# Patient Record
Sex: MALE | URBAN - METROPOLITAN AREA
[De-identification: names, ages, dates, MRNs, and addresses within clinical notes are randomized per-mention and may not be internally consistent; named-entity substitution may affect disease eponyms.]

---

## 2017-09-19 ENCOUNTER — IMPORTED ENCOUNTER (OUTPATIENT)
Dept: URBAN - METROPOLITAN AREA CLINIC 31 | Facility: CLINIC | Age: 77
End: 2017-09-19

## 2017-09-19 PROBLEM — E11.3492: Noted: 2017-09-19

## 2017-09-19 PROBLEM — Z96.1: Noted: 2017-09-19

## 2017-09-19 PROBLEM — H35.3111: Noted: 2017-09-19

## 2017-09-19 PROBLEM — E11.9: Noted: 2017-09-19

## 2017-09-19 PROBLEM — H27.02: Noted: 2017-09-19

## 2017-09-19 PROCEDURE — 92015 DETERMINE REFRACTIVE STATE: CPT

## 2017-09-19 PROCEDURE — 92004 COMPRE OPH EXAM NEW PT 1/>: CPT

## 2017-09-19 NOTE — PATIENT DISCUSSION
1.  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 Discussed the pathophysiology of diabetes and its effect on the eye. Stressed the importance of regular followup and good control of BS BP and Lipids to avoid future complications. 2. OD---DM II without sign of Diabetic Retinopathy OD:  Old PRP scars periphery  POsterior pole is clear and no hemes. 3.  Pseudophakia OD - IOL stable. Open capsule. Monitor. 4. Aphakia OS - 5. ARMD OD dry - Importance of smoking cessation blood pressure control and healthy diet were emphasized. In accordance with the AREDS study a good multivitamin containing EC and Zinc were recommened to be taken daily. Patient was instructed to self monitor their monocular vision (reading/Amsler Grid) at least weekly. Patient should immediately report any new onset of decreased vision or metamorphopsia. 6. Band keratopathy OS-  Use tears and monitor for pain. 7.  Optional rx changes. 8.  Disc with pt and daughter to keep right eye protected. 9.   RTN    1yr CE

## 2018-06-28 NOTE — PATIENT DISCUSSION
punctal probing and irrigation performed in the office with out improvement. Hz of plugs. Alrex rx given OD tid x 10 days.

## 2018-06-28 NOTE — PROCEDURE NOTE: CLINICAL
PROCEDURE NOTE: Dilation of Lacrimal Punctum, With or Without Irrigation Anesthesia: Local. Prep: Alcohol Prep/Wipe. Prior to treatment, the risks/benefits/alternatives were discussed. The patient wished to proceed with procedure. The punctum was dilated with a punctum dilator. A syringe and cannula were used to irrigate water into the canaliculus. Patient tolerated procedure well. There were no complications. Post op instructions given. Parrish Duncan

## 2018-06-28 NOTE — PATIENT DISCUSSION
The IOP remains elevated despite current medication. Glaucoma laser surgery is recommended. The risks, benefits, and alternatives to SLT were discussed.

## 2018-07-26 NOTE — PATIENT DISCUSSION
Discussed with patient treatment options for glaucoma: Drops, SLT laser, or surgery the risks and benefits of each were explained.

## 2018-07-26 NOTE — PROCEDURE NOTE: CLINICAL
PROCEDURE NOTE: SLT OD. Diagnosis: POAG, Mild. Prior to treatment, risks/benefits/alternatives discussed including infection, loss of vision, hemorrhage, cataract, glaucoma, retinal tears or detachment. Lens:  SLT laser lens with goniosol. Power: 1.2mJ. Total applications: 832. Application 888 degrees. Patient tolerated procedure well. There were no complications. Post-op instructions given. Post-op IOP = 22 mmHg. Abimael Devi

## 2018-08-21 NOTE — PROCEDURE NOTE: CLINICAL
PROCEDURE NOTE: SLT OS. Diagnosis: POAG, Mild. Anesthesia: Topical. Prep: Alphagan 0.15%. Prior to treatment, risks/benefits/alternatives discussed including infection, loss of vision, hemorrhage, cataract, glaucoma, retinal tears or detachment. Lens:  SLT laser lens with goniosol. Power: 1.2mJ. Total applications: 011. Application 787 degrees. Patient tolerated procedure well. There were no complications. Post-op instructions given. Post-op IOP = 16 mmHg. Tono Greening

## 2018-11-01 ENCOUNTER — IMPORTED ENCOUNTER (OUTPATIENT)
Dept: URBAN - METROPOLITAN AREA CLINIC 31 | Facility: CLINIC | Age: 78
End: 2018-11-01

## 2018-11-01 PROBLEM — Z96.1: Noted: 2018-11-01

## 2018-11-01 PROBLEM — H27.02: Noted: 2018-11-01

## 2018-11-01 PROBLEM — H35.3111: Noted: 2018-11-01

## 2018-11-01 PROBLEM — E11.9: Noted: 2018-11-01

## 2018-11-01 PROBLEM — E11.3492: Noted: 2018-11-01

## 2018-11-01 PROCEDURE — 92250 FUNDUS PHOTOGRAPHY W/I&R: CPT

## 2018-11-01 PROCEDURE — 92014 COMPRE OPH EXAM EST PT 1/>: CPT

## 2018-11-01 PROCEDURE — 92015 DETERMINE REFRACTIVE STATE: CPT

## 2018-11-01 NOTE — PATIENT DISCUSSION
1.  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 Discussed the pathophysiology of diabetes and its effect on the eye. Stressed the importance of regular followup and good control of BS BP and Lipids to avoid future complications. 2. OD---DM II---NO BDR OD:  Old PRP scars periphery  POsterior pole is clear and no hemes. 3.  Pseudophakia OD - IOL stable. Open capsule. Monitor. 4. Aphakia OS - 5. ARMD OD dry -   No smoking wears swetha suns vits and diet. Amsler Grid) at least weekly. Patient should immediately report any new onset of decreased vision or metamorphopsia. 6. Band keratopathy OS-  Use tears and monitor for pain. 7.  Dist rx good. Near rx has pt holding material at 8-10 inches and pt can see 20/30. Further away it was more blurry. Pt will hold things closer for now instead of changing gls since he seldom reads. Mainly menus. 8.  Disc with pt and daughter to keep right eye protected. 9.   RTN    1yr CE  Early Dementia

## 2021-05-05 NOTE — PATIENT DISCUSSION
Called Amy to check in. Per Yolanda Araya, Cards 1 is capped and cannot direct admit. Can try to admit to hospital medicine. Would need a stat COVID test which is done at our clinic. Left Amy voicemail to review and sent [employer] message. Vidhi Montgomery RN     Recommended artificial tears to use: 1 drop 4x a day in both eyes.

## 2021-12-04 NOTE — PATIENT DISCUSSION
punctal probing and irrigation performed in the office with out improvement. Hz of plugs. Alrex rx given OD tid x 10 days. No

## 2022-04-02 ASSESSMENT — VISUAL ACUITY: OD_SC: 20/25-3

## 2022-04-02 ASSESSMENT — TONOMETRY
OS_IOP_MMHG: 10
OD_IOP_MMHG: 16
OS_IOP_MMHG: 15
OD_IOP_MMHG: 15